# Patient Record
Sex: FEMALE | Race: BLACK OR AFRICAN AMERICAN | NOT HISPANIC OR LATINO | Employment: STUDENT | URBAN - METROPOLITAN AREA
[De-identification: names, ages, dates, MRNs, and addresses within clinical notes are randomized per-mention and may not be internally consistent; named-entity substitution may affect disease eponyms.]

---

## 2020-10-07 ENCOUNTER — HOSPITAL ENCOUNTER (EMERGENCY)
Facility: HOSPITAL | Age: 20
Discharge: HOME | End: 2020-10-07
Attending: EMERGENCY MEDICINE
Payer: COMMERCIAL

## 2020-10-07 VITALS
TEMPERATURE: 98 F | DIASTOLIC BLOOD PRESSURE: 57 MMHG | RESPIRATION RATE: 16 BRPM | HEART RATE: 79 BPM | OXYGEN SATURATION: 98 % | BODY MASS INDEX: 51.91 KG/M2 | WEIGHT: 293 LBS | SYSTOLIC BLOOD PRESSURE: 119 MMHG | HEIGHT: 63 IN

## 2020-10-07 DIAGNOSIS — R10.9 ABDOMINAL CRAMPS: Primary | ICD-10-CM

## 2020-10-07 LAB
BUN SERPL-MCNC: 7 MG/DL (ref 6–30)
CHLORIDE SERPL-SCNC: 104 MMOL/L (ref 95–110)
CREAT SERPL-MCNC: 0.5 MG/DL (ref 0.5–1.4)
GLUCOSE SERPL-MCNC: 104 MG/DL (ref 70–110)
HCT VFR BLD CALC: 40 %PCV (ref 36–54)
POC IONIZED CALCIUM: 1.22 MMOL/L (ref 1.06–1.42)
POC TCO2 (MEASURED): 22 MMOL/L (ref 23–29)
POTASSIUM BLD-SCNC: 4.2 MMOL/L (ref 3.5–5.1)
SAMPLE: ABNORMAL
SODIUM BLD-SCNC: 139 MMOL/L (ref 136–145)

## 2020-10-07 PROCEDURE — 99284 EMERGENCY DEPT VISIT MOD MDM: CPT | Mod: 25

## 2020-10-07 PROCEDURE — 99284 EMERGENCY DEPT VISIT MOD MDM: CPT | Mod: ,,, | Performed by: EMERGENCY MEDICINE

## 2020-10-07 PROCEDURE — 80047 BASIC METABLC PNL IONIZED CA: CPT

## 2020-10-07 PROCEDURE — 25000003 PHARM REV CODE 250: Performed by: EMERGENCY MEDICINE

## 2020-10-07 PROCEDURE — 99284 PR EMERGENCY DEPT VISIT,LEVEL IV: ICD-10-PCS | Mod: ,,, | Performed by: EMERGENCY MEDICINE

## 2020-10-07 RX ORDER — DICYCLOMINE HYDROCHLORIDE 10 MG/1
20 CAPSULE ORAL
Status: COMPLETED | OUTPATIENT
Start: 2020-10-07 | End: 2020-10-07

## 2020-10-07 RX ORDER — ONDANSETRON 4 MG/1
8 TABLET, ORALLY DISINTEGRATING ORAL EVERY 6 HOURS PRN
Qty: 6 TABLET | Refills: 0 | Status: SHIPPED | OUTPATIENT
Start: 2020-10-07

## 2020-10-07 RX ORDER — DICYCLOMINE HYDROCHLORIDE 20 MG/1
20 TABLET ORAL 2 TIMES DAILY PRN
Qty: 10 TABLET | Refills: 0 | Status: SHIPPED | OUTPATIENT
Start: 2020-10-07 | End: 2020-10-12

## 2020-10-07 RX ADMIN — DICYCLOMINE HYDROCHLORIDE 20 MG: 10 CAPSULE ORAL at 10:10

## 2020-10-07 NOTE — ED TRIAGE NOTES
Patient states zay lower abd pelvic pain, at 0700, states attempt to have a BM with small hard BM after about 45 min, states positive nausea and vomiting. Denies vaginal drainage or discharge Denies urinary symptoms.  Denies fevers.

## 2020-10-07 NOTE — ED NOTES
Patient identifiers verified and correct for Ms Wagner  C/C: Lower abd pain SEE NN  APPEARANCE: awake and alert in NAD.  SKIN: warm, dry and intact. No breakdown or bruising.  MUSCULOSKELETAL: Patient moving all extremities spontaneously, no obvious swelling or deformities noted. Ambulates independently.  RESPIRATORY: Denies shortness of breath.Respirations unlabored. Denies fevers.   CARDIAC: Denies CP, 2+ distal pulses; no peripheral edema  ABDOMEN: ABdomen round, positive nausea and vomiting, positive constipation and diarrhea.   : voids spontaneously, denies difficulty  Neurologic: AAO x 4; follows commands equal strength in all extremities; denies numbness/tingling. Denies dizziness Denies weakness

## 2020-10-07 NOTE — ED PROVIDER NOTES
SCRIBE #1 NOTE: I, Carole Jones, am scribing for, and in the presence of,  Kelvin Seth MD. I have scribed the following portions of the note - Other sections scribed: HPI ROS PE.         Source of History:  The Patient     Chief complaint:  Abdominal Pain (difficulty having BM this am/ states straining over 30 minutes--- was able to defecate- still having abd cramping)      HPI:  Avila Wagner is a 20 y.o. female presenting with abdominal pain onset this morning. The patient reports she woke up this morning with diffuse abdominal pain. She had difficulty having a bowel movement which initially started with 4 hard stools followed by looser/watery stools after. She then only had pain to her lower abdomen which she describes as a cramping sensation. Also notes of associated nausea and vomiting this morning. Denies fever, chills.      ROS: As per HPI and below:  General: No fever.  No chills.  Eyes: No visual changes.  Head: No headache.    Integument: No rashes or lesions.  Chest: No shortness of breath.  Cardiovascular: No chest pain.  Abdomen: +abdominal pain.  No nausea or vomiting.  Urinary: No abnormal urination.  Neurologic: No focal weakness.  No numbness.  Hematologic: No easy bruising.  Endocrine: No excessive thirst or urination.      Review of patient's allergies indicates:  No Known Allergies    No current facility-administered medications on file prior to encounter.      Current Outpatient Medications on File Prior to Encounter   Medication Sig Dispense Refill    amitriptyline (ELAVIL) 10 MG tablet Take 10 mg by mouth nightly as needed.         PMH:  As per HPI and below:  Past Medical History:   Diagnosis Date    Anxiety     Obesity      History reviewed. No pertinent surgical history.    Social History     Socioeconomic History    Marital status: Single     Spouse name: Not on file    Number of children: Not on file    Years of education: Not on file    Highest education level: Not on  "file   Occupational History    Not on file   Social Needs    Financial resource strain: Not on file    Food insecurity     Worry: Not on file     Inability: Not on file    Transportation needs     Medical: Not on file     Non-medical: Not on file   Tobacco Use    Smoking status: Never Smoker    Smokeless tobacco: Never Used   Substance and Sexual Activity    Alcohol use: Yes     Comment: occas    Drug use: No    Sexual activity: Not Currently     Birth control/protection: None   Lifestyle    Physical activity     Days per week: Not on file     Minutes per session: Not on file    Stress: Not on file   Relationships    Social connections     Talks on phone: Not on file     Gets together: Not on file     Attends [employer] service: Not on file     Active member of club or organization: Not on file     Attends meetings of clubs or organizations: Not on file     Relationship status: Not on file   Other Topics Concern    Not on file   Social History Narrative    Not on file       Family History   Problem Relation Age of Onset    Pancreatic cancer Paternal Grandfather        Physical Exam:    Vitals:    10/07/20 0942 10/07/20 1050   BP: (!) 178/102 (!) 119/57   BP Location: Right arm Left arm   Patient Position: Sitting Sitting   Pulse: (!) 116 79   Resp: 17 16   Temp: 98.2 °F (36.8 °C) 98 °F (36.7 °C)   TempSrc: Oral Oral   SpO2: 98% 98%   Weight: 136.1 kg (300 lb)    Height: 5' 3" (1.6 m)      Appearance: No acute distress.  Skin: No rashes seen.  Good turgor.  No abrasions.  No ecchymoses.  Eyes: No conjunctival injection.  ENT: Oropharynx clear.    Chest: Clear to auscultation bilaterally.  Good air movement.  No wheezes.  No rhonchi.  Cardiovascular: Regular rate and rhythm.  No murmurs. No gallops. No rubs.  Abdomen: Soft.  Not distended.  Nontender. No lower abdominal tenderness.  No guarding.  No rebound.  Musculoskeletal: Good range of motion all joints.  No deformities.  Neck supple.  No " meningismus.  Neurologic: Motor intact.  Sensation intact.  Cerebellar intact.  Cranial nerves intact.  Mental Status:  Alert and oriented x 3.  Appropriate, conversant.      Initial Impression:  Abdominal cramps associated with bowel movements; for solid, then watery.  Her exam is completely benign.  She felt fine in bed; the symptoms started when she stood up.  Seems like a stomach bug or may be food related illness.  Do not think it is surgical.  Going to check an i-STAT and give her some Bentyl and reassess.    Laboratory Studies:  Labs Reviewed   ISTAT PROCEDURE - Abnormal; Notable for the following components:       Result Value    POC TCO2 (MEASURED) 22 (*)     All other components within normal limits   ISTAT CHEM8       I decided to obtain the patient's medical records.    Medications Given:  Medications   dicyclomine capsule 20 mg (20 mg Oral Given 10/7/20 1020)       ED Course:  ED Course as of Oct 07 1109   Wed Oct 07, 2020   1031 POC Creatinine: 0.5 [DC]   1031 POC Glucose: 104 [DC]   1031 POC Potassium: 4.2 [DC]   1031 POC TCO2 (MEASURED)(!): 22 [DC]   1059 Tolerated PO in ED    [DC]   1059 Pulse: 79 [DC]      ED Course User Index  [DC] Kelvin Seth MD              MDM:    20 y.o. female with abdominal cramps, feels much better after meds.  Repeat abdominal exam is again benign.  Pulse is now normal.  I do not think this represents significant colitis, diverticulitis, appendicitis.  I doubt gynecologic complaint given that symptoms were related to bowel movements.  Since everything is resolved now, I see no need extensive workup and recommend follow-up with PCP.    Diagnostic Impression:    1. Abdominal cramps         ED Disposition Condition    Discharge Stable        ED Prescriptions     Medication Sig Dispense Start Date End Date Auth. Provider    dicyclomine (BENTYL) 20 mg tablet Take 1 tablet (20 mg total) by mouth 2 (two) times daily as needed (abdominal cramps). 10 tablet 10/7/2020  10/12/2020 Kelvin Seth MD    ondansetron (ZOFRAN-ODT) 4 MG TbDL Take 2 tablets (8 mg total) by mouth every 6 (six) hours as needed (nausea/vomiting). 6 tablet 10/7/2020  Kelvin Seth MD        Follow-up Information     Follow up With Specialties Details Why Contact Info    Your primary care doctor in 2-3 days if symptoms persist                               Kelvin Seth MD  10/07/20 9307